# Patient Record
(demographics unavailable — no encounter records)

---

## 2024-11-09 NOTE — HISTORY OF PRESENT ILLNESS
[FreeTextEntry1] : patient presents for a 4 week follow up for type II diabetes [de-identified] : KIRAN MILLER is a 47 year female who presents today Nov 09, 2024 for type II diabetes follow up  11/9/24: She has been checking blood sugar every morning prior to breakfast. She has the One Touch Verio Flex machine. Her POCT A1C today 8.0%  Her morning fasting blood sugar are as follows:   10/17: 154, 10/18 149, 10/19: 139, 10/21: 164, 10/22 124, 10/24 131, 11/1 152, 11/2 132 , 11/4 127, 11/5 144, 11/6 144, 11/7 175, 11/18 148  10/12/24: She reports she has been sticking to grilled chicken and veggies. She cut out ice cream.. She has been taking Metformin 500mg 2 pills together at once at nigh time because was getting diarrhea in morning. POCT A1C 8.5% 9/4/24: Has not been seen i office since 2018. Planning for surgery for uterine polp removal and blood sugar was found to be 202. POCT A1c found to be 10.3. She was started on Metformin 500mg BID.

## 2024-11-09 NOTE — ASSESSMENT
[FreeTextEntry1] : KIRAN MILLER is a 47 year female who presents today Nov 09, 2024 for type II diabetes  Type II diabetes - A1C Sept 10.3, Oct A1c 8.5-8.7, Nov 8.0 - Advised to aim for morning blood glucose less than 130. Advised to download one touch betzy and download blood sugar readings - Continue Metformin 1000mg QD.  - Advised may have surgery now that A1c is 8.0% and less.  - Return for medical clearance.

## 2024-12-07 NOTE — HISTORY OF PRESENT ILLNESS
[No Pertinent Cardiac History] : no history of aortic stenosis, atrial fibrillation, coronary artery disease, recent myocardial infarction, or implantable device/pacemaker [Diabetes] : diabetes [(Patient denies any chest pain, claudication, dyspnea on exertion, orthopnea, palpitations or syncope)] : Patient denies any chest pain, claudication, dyspnea on exertion, orthopnea, palpitations or syncope [Excellent (>10 METs)] : Excellent (>10 METs) [Aortic Stenosis] : no aortic stenosis [Atrial Fibrillation] : no atrial fibrillation [Coronary Artery Disease] : no coronary artery disease [Recent Myocardial Infarction] : no recent myocardial infarction [Implantable Device/Pacemaker] : no implantable device/pacemaker [Asthma] : no asthma [COPD] : no COPD [Sleep Apnea] : no sleep apnea [Smoker] : not a smoker [Family Member] : no family member with adverse anesthesia reaction/sudden death [Self] : no previous adverse anesthesia reaction [Chronic Anticoagulation] : no chronic anticoagulation [Chronic Kidney Disease] : no chronic kidney disease [FreeTextEntry1] : uterus polyp  [FreeTextEntry2] : 12/12/24 [FreeTextEntry3] : Dr. Pandya [FreeTextEntry4] : pt presents for medical clearance

## 2024-12-07 NOTE — ASSESSMENT
[Continue anticoagulant treatment as is] : Continue current anticoagulant treatment [Continue anti-platelet treatment as is] : Continue current anti-platelet treatment [Continue medications as is] : Continue current medications [As per surgery] : as per surgery [Patient Optimized for Surgery] : Patient optimized for surgery

## 2024-12-07 NOTE — RESULTS/DATA
[] : results reviewed [de-identified] : NL [de-identified] : NL [de-identified] : NL [de-identified] : NSR  [de-identified] : A1C 7.2

## 2025-02-20 NOTE — PLAN
[FreeTextEntry1] : DM II reduce metformin to 500 once dialy add farxiga 5mg daily repeat A1C RTO 3 months for follow up explained side effects to moniter for such as hypoglycemia and hyperglycemia she will continue to moniter blood sugars fasting daily

## 2025-02-20 NOTE — HISTORY OF PRESENT ILLNESS
[FreeTextEntry1] : patient presents for a medication check wants to change (metformin HCI 1000MG) for something else since it's bothering her stomach  duration: ever since she started  took both in the evening and didn't work so she decided to take one in the morning and then the afternoon and that still didn't work at all either  [de-identified] : 48 year old female recently diagnosed with diabetes type II on 500mg metformin twice daily presenting in office to discuss side effects of medications. She has been taking medication for 3 months now and continues to have severe GI upset, nausea, diarrhea. She feels she cannot tolerate it. She is requesting med change   FS blood sugar 140's usually She has changed diet, has reduced sugar intake drastically but occasionally will have piece of cake or brownie

## 2025-04-18 NOTE — PLAN
[FreeTextEntry1] : DM II stopped farxiga- was feeling nauseous with frequent yeast infections increased metformin to 500 twice daily repeat A1C RTO 3 months for follow up explained side effects to monitor for such as hypoglycemia and hyperglycemia she will continue to monitor blood sugars fasting daily  HLD repeat lipids will likely need to start statin

## 2025-04-18 NOTE — HISTORY OF PRESENT ILLNESS
[FreeTextEntry1] : patient presents for a follow up   No Complaints at this moment patient states she got the flu shot  [de-identified] : 48 year old female presenting in office for repeat A1C and lipid check for DM and HLD. She is feeling well  Breakfast egg with whole ehwat toast and ketchup lunch stirfry and protein dinner- some stirfry and protein  exercise- walking 2-3 miles per day-  had back surgery in january so now her  is able to get out and about   Fast blood sugars ranging from 125- 118 in AM, checks daily

## 2025-07-21 NOTE — PLAN
[FreeTextEntry1] : DM II reduce metformin to 500 once dialy DC farxiga did not tolerate well  repeat A1C- fasting blood sugars have been well controlled  RTO 3 months for follow up explained side effects to moniter for such as hypoglycemia and hyperglycemia she will continue to moniter blood sugars fasting daily

## 2025-07-21 NOTE — HEALTH RISK ASSESSMENT
[0] : 2) Feeling down, depressed, or hopeless: Not at all (0) [PHQ-2 Negative - No further assessment needed] : PHQ-2 Negative - No further assessment needed [Never] : Never [[id number]] : 0

## 2025-07-21 NOTE — HISTORY OF PRESENT ILLNESS
[FreeTextEntry1] : Patient presents for 3 month DM II follow up and medication refills.  [de-identified] : 48 year old female recently diagnosed with diabetes type II on 500mg metformin twice daily  breakfast 2 eggs on whole wheat toast with peaches, cherries,  lunch- sometimes skips occasionally an apple or banana dinner - stirfry grilled chicken and veggies does not really snack  Fasting BS- raning 110's-120's- have been reduced since last visit